# Patient Record
Sex: FEMALE | Race: WHITE
[De-identification: names, ages, dates, MRNs, and addresses within clinical notes are randomized per-mention and may not be internally consistent; named-entity substitution may affect disease eponyms.]

---

## 2018-01-08 ENCOUNTER — HOSPITAL ENCOUNTER (EMERGENCY)
Dept: HOSPITAL 95 - ER | Age: 33
Discharge: HOME | End: 2018-01-08
Payer: COMMERCIAL

## 2018-01-08 VITALS — BODY MASS INDEX: 28.25 KG/M2 | WEIGHT: 180.01 LBS | HEIGHT: 67 IN

## 2018-01-08 DIAGNOSIS — Z91.018: ICD-10-CM

## 2018-01-08 DIAGNOSIS — R06.2: ICD-10-CM

## 2018-01-08 DIAGNOSIS — R06.02: Primary | ICD-10-CM

## 2018-01-08 DIAGNOSIS — Z88.0: ICD-10-CM

## 2018-01-08 DIAGNOSIS — R05: ICD-10-CM

## 2018-01-08 DIAGNOSIS — Z90.49: ICD-10-CM

## 2018-01-08 LAB
ALBUMIN SERPL BCP-MCNC: 3.8 G/DL (ref 3.4–5)
ALBUMIN/GLOB SERPL: 0.8 {RATIO} (ref 0.8–1.8)
ALT SERPL W P-5'-P-CCNC: 14 U/L (ref 12–78)
ANION GAP SERPL CALCULATED.4IONS-SCNC: 10 MMOL/L (ref 6–16)
AST SERPL W P-5'-P-CCNC: 13 U/L (ref 12–37)
BASOPHILS # BLD AUTO: 0.11 K/MM3 (ref 0–0.23)
BASOPHILS NFR BLD AUTO: 1 % (ref 0–2)
BILIRUB SERPL-MCNC: 0.1 MG/DL (ref 0.1–1)
BUN SERPL-MCNC: 14 MG/DL (ref 8–24)
CALCIUM SERPL-MCNC: 8.6 MG/DL (ref 8.5–10.1)
CHLORIDE SERPL-SCNC: 109 MMOL/L (ref 98–108)
CO2 SERPL-SCNC: 24 MMOL/L (ref 21–32)
CREAT SERPL-MCNC: 0.88 MG/DL (ref 0.4–1)
DEPRECATED RDW RBC AUTO: 40 FL (ref 35.1–46.3)
EOSINOPHIL # BLD AUTO: 1.8 K/MM3 (ref 0–0.68)
EOSINOPHIL NFR BLD AUTO: 15 % (ref 0–6)
ERYTHROCYTE [DISTWIDTH] IN BLOOD BY AUTOMATED COUNT: 14.1 % (ref 11.7–14.2)
GLOBULIN SER CALC-MCNC: 4.5 G/DL (ref 2.2–4)
GLUCOSE SERPL-MCNC: 97 MG/DL (ref 70–99)
HCT VFR BLD AUTO: 36.7 % (ref 33–51)
HGB BLD-MCNC: 12.3 G/DL (ref 11.5–16)
IMM GRANULOCYTES # BLD AUTO: 0.02 K/MM3 (ref 0–0.1)
IMM GRANULOCYTES NFR BLD AUTO: 0 % (ref 0–1)
LYMPHOCYTES # BLD AUTO: 3.77 K/MM3 (ref 0.84–5.2)
LYMPHOCYTES NFR BLD AUTO: 32 % (ref 21–46)
MCHC RBC AUTO-ENTMCNC: 33.5 G/DL (ref 31.5–36.5)
MCV RBC AUTO: 79 FL (ref 80–100)
MONOCYTES # BLD AUTO: 0.65 K/MM3 (ref 0.16–1.47)
MONOCYTES NFR BLD AUTO: 6 % (ref 4–13)
NEUTROPHILS # BLD AUTO: 5.37 K/MM3 (ref 1.96–9.15)
NEUTROPHILS NFR BLD AUTO: 46 % (ref 41–73)
NRBC # BLD AUTO: 0 K/MM3 (ref 0–0.02)
NRBC BLD AUTO-RTO: 0 /100 WBC (ref 0–0.2)
PLATELET # BLD AUTO: 311 K/MM3 (ref 150–400)
POTASSIUM SERPL-SCNC: 3.3 MMOL/L (ref 3.5–5.5)
PROT SERPL-MCNC: 8.3 G/DL (ref 6.4–8.2)
SODIUM SERPL-SCNC: 143 MMOL/L (ref 136–145)

## 2019-04-15 ENCOUNTER — HOSPITAL ENCOUNTER (EMERGENCY)
Dept: HOSPITAL 95 - ER | Age: 34
Discharge: HOME | End: 2019-04-15
Payer: COMMERCIAL

## 2019-04-15 VITALS — BODY MASS INDEX: 25.77 KG/M2 | HEIGHT: 70 IN | WEIGHT: 180.01 LBS

## 2019-04-15 DIAGNOSIS — J45.909: ICD-10-CM

## 2019-04-15 DIAGNOSIS — Z91.018: ICD-10-CM

## 2019-04-15 DIAGNOSIS — N10: ICD-10-CM

## 2019-04-15 DIAGNOSIS — Z79.899: ICD-10-CM

## 2019-04-15 DIAGNOSIS — O03.9: Primary | ICD-10-CM

## 2019-04-15 DIAGNOSIS — G35: ICD-10-CM

## 2019-04-15 DIAGNOSIS — Z88.0: ICD-10-CM

## 2019-04-15 LAB
ALBUMIN SERPL BCP-MCNC: 3.2 G/DL
ALBUMIN/GLOB SERPL: 0.7 {RATIO}
ALT SERPL W P-5'-P-CCNC: 13 U/L
ANION GAP SERPL CALCULATED.4IONS-SCNC: 9 MMOL/L
AST SERPL W P-5'-P-CCNC: 12 U/L
B-HCG SERPL-ACNC: (no result) MIU/ML
BASOPHILS # BLD: 0 K/MM3
BASOPHILS NFR BLD: 0 %
BILIRUB SERPL-MCNC: 0.5 MG/DL
BILIRUB UR QL STRIP: (no result)
BUN SERPL-MCNC: 6 MG/DL
CALCIUM SERPL-MCNC: 8.8 MG/DL
CHLORIDE SERPL-SCNC: 104 MMOL/L
CO2 SERPL-SCNC: 22 MMOL/L
CREAT SERPL-MCNC: 0.64 MG/DL
DEPRECATED RDW RBC AUTO: 44.5 FL
EOSINOPHIL # BLD: 0.55 K/MM3
EOSINOPHIL NFR BLD: 2 %
ERYTHROCYTE [DISTWIDTH] IN BLOOD BY AUTOMATED COUNT: 16.4 %
GLOBULIN SER CALC-MCNC: 4.8 G/DL
GLUCOSE SERPL-MCNC: 115 MG/DL
HCT VFR BLD AUTO: 35 %
HGB BLD-MCNC: 11 G/DL
KETONES UR STRIP-MCNC: (no result) MG/DL
LEUKOCYTE ESTERASE UR QL STRIP: (no result)
LYMPHOCYTES # BLD: 1.94 K/MM3
LYMPHOCYTES NFR BLD: 7 %
MCHC RBC AUTO-ENTMCNC: 31.4 G/DL
MCV RBC AUTO: 76 FL
MONOCYTES # BLD: 1.11 K/MM3
MONOCYTES NFR BLD: 4 %
NEUTS BAND NFR BLD MANUAL: 2 %
NEUTS SEG # BLD MANUAL: 24.16 K/MM3
NEUTS SEG NFR BLD MANUAL: 85 %
NRBC # BLD AUTO: 0 K/MM3
NRBC BLD AUTO-RTO: 0 /100 WBC
PLATELET # BLD AUTO: 339 K/MM3
POTASSIUM SERPL-SCNC: 3.3 MMOL/L
PROT SERPL-MCNC: 8 G/DL
PROT UR STRIP-MCNC: (no result) MG/DL
RBC #/AREA URNS HPF: (no result) /HPF
SODIUM SERPL-SCNC: 135 MMOL/L
SP GR SPEC: 1.01
TOTAL CELLS COUNTED BLD: 100
UROBILINOGEN UR STRIP-MCNC: (no result) MG/DL
WBC #/AREA URNS HPF: (no result) /HPF

## 2019-06-24 ENCOUNTER — HOSPITAL ENCOUNTER (EMERGENCY)
Dept: HOSPITAL 95 - ER | Age: 34
Discharge: HOME | End: 2019-06-24
Payer: COMMERCIAL

## 2019-06-24 VITALS — BODY MASS INDEX: 12.17 KG/M2 | WEIGHT: 85.01 LBS | HEIGHT: 70 IN

## 2019-06-24 DIAGNOSIS — Z91.018: ICD-10-CM

## 2019-06-24 DIAGNOSIS — Z88.1: ICD-10-CM

## 2019-06-24 DIAGNOSIS — J45.909: ICD-10-CM

## 2019-06-24 DIAGNOSIS — N93.9: Primary | ICD-10-CM

## 2019-06-24 DIAGNOSIS — G35: ICD-10-CM

## 2019-06-24 LAB
ALBUMIN SERPL BCP-MCNC: 3.6 G/DL (ref 3.4–5)
ALBUMIN/GLOB SERPL: 0.9 {RATIO} (ref 0.8–1.8)
ALT SERPL W P-5'-P-CCNC: 19 U/L (ref 12–78)
ANION GAP SERPL CALCULATED.4IONS-SCNC: 8 MMOL/L (ref 6–16)
AST SERPL W P-5'-P-CCNC: 18 U/L (ref 12–37)
B-HCG SERPL-ACNC: 4 MIU/ML (ref 0–3)
BASOPHILS # BLD AUTO: 0.09 K/MM3 (ref 0–0.23)
BASOPHILS NFR BLD AUTO: 1 % (ref 0–2)
BILIRUB SERPL-MCNC: 0.2 MG/DL (ref 0.1–1)
BUN SERPL-MCNC: 9 MG/DL (ref 8–24)
CALCIUM SERPL-MCNC: 8.1 MG/DL (ref 8.5–10.1)
CHLORIDE SERPL-SCNC: 108 MMOL/L (ref 98–108)
CO2 SERPL-SCNC: 24 MMOL/L (ref 21–32)
CREAT SERPL-MCNC: 0.87 MG/DL (ref 0.4–1)
DEPRECATED RDW RBC AUTO: 42.7 FL (ref 35.1–46.3)
EOSINOPHIL # BLD AUTO: 0.57 K/MM3 (ref 0–0.68)
EOSINOPHIL NFR BLD AUTO: 6 % (ref 0–6)
ERYTHROCYTE [DISTWIDTH] IN BLOOD BY AUTOMATED COUNT: 16 % (ref 11.7–14.2)
GLOBULIN SER CALC-MCNC: 4 G/DL (ref 2.2–4)
GLUCOSE SERPL-MCNC: 89 MG/DL (ref 70–99)
HCT VFR BLD AUTO: 31.5 % (ref 33–51)
HGB BLD-MCNC: 9.6 G/DL (ref 11.5–16)
IMM GRANULOCYTES # BLD AUTO: 0.02 K/MM3 (ref 0–0.1)
IMM GRANULOCYTES NFR BLD AUTO: 0 % (ref 0–1)
LYMPHOCYTES # BLD AUTO: 2.27 K/MM3 (ref 0.84–5.2)
LYMPHOCYTES NFR BLD AUTO: 25 % (ref 21–46)
MCHC RBC AUTO-ENTMCNC: 30.5 G/DL (ref 31.5–36.5)
MCV RBC AUTO: 74 FL (ref 80–100)
MONOCYTES # BLD AUTO: 0.78 K/MM3 (ref 0.16–1.47)
MONOCYTES NFR BLD AUTO: 9 % (ref 4–13)
NEUTROPHILS # BLD AUTO: 5.24 K/MM3 (ref 1.96–9.15)
NEUTROPHILS NFR BLD AUTO: 58 % (ref 41–73)
NRBC # BLD AUTO: 0 K/MM3 (ref 0–0.02)
NRBC BLD AUTO-RTO: 0 /100 WBC (ref 0–0.2)
PLATELET # BLD AUTO: 292 K/MM3 (ref 150–400)
POTASSIUM SERPL-SCNC: 3.3 MMOL/L (ref 3.5–5.5)
PROT SERPL-MCNC: 7.6 G/DL (ref 6.4–8.2)
SODIUM SERPL-SCNC: 140 MMOL/L (ref 136–145)

## 2019-06-28 ENCOUNTER — HOSPITAL ENCOUNTER (OUTPATIENT)
Dept: HOSPITAL 95 - ORSCSDS | Age: 34
Discharge: HOME | End: 2019-06-28
Attending: OBSTETRICS & GYNECOLOGY
Payer: COMMERCIAL

## 2019-06-28 VITALS — HEIGHT: 70 IN | WEIGHT: 179.24 LBS | BODY MASS INDEX: 25.66 KG/M2

## 2019-06-28 DIAGNOSIS — J45.909: ICD-10-CM

## 2019-06-28 DIAGNOSIS — F17.210: ICD-10-CM

## 2019-06-28 DIAGNOSIS — Z79.899: ICD-10-CM

## 2019-06-28 DIAGNOSIS — O03.39: Primary | ICD-10-CM

## 2019-06-28 DIAGNOSIS — D64.9: ICD-10-CM

## 2019-06-28 PROCEDURE — 0UDB7ZX EXTRACTION OF ENDOMETRIUM, VIA NATURAL OR ARTIFICIAL OPENING, DIAGNOSTIC: ICD-10-PCS | Performed by: OBSTETRICS & GYNECOLOGY

## 2019-06-28 PROCEDURE — 0UJD8ZZ INSPECTION OF UTERUS AND CERVIX, VIA NATURAL OR ARTIFICIAL OPENING ENDOSCOPIC: ICD-10-PCS | Performed by: OBSTETRICS & GYNECOLOGY

## 2019-06-28 PROCEDURE — 0UDB8ZX EXTRACTION OF ENDOMETRIUM, VIA NATURAL OR ARTIFICIAL OPENING ENDOSCOPIC, DIAGNOSTIC: ICD-10-PCS | Performed by: OBSTETRICS & GYNECOLOGY

## 2019-06-28 PROCEDURE — 0UDB8ZZ EXTRACTION OF ENDOMETRIUM, VIA NATURAL OR ARTIFICIAL OPENING ENDOSCOPIC: ICD-10-PCS | Performed by: OBSTETRICS & GYNECOLOGY

## 2019-06-28 NOTE — NUR
06/28/19 1136 Keyla Ledesma
SHELBIE PAD CHANGED, WITH DISP UNDERWEAR, MODERATE AMT BLOOD STAIN ON
CHUCKS, NO CLOTS. PATIENT C/O CRAMPING LIKE DISCOMFORT IN BACK, 4/10.
DECLINES PAIN MEDS AT THIS TIME, METHERGIN IM PER ORDER. DINORA WELL

## 2019-06-28 NOTE — NUR
06/28/19 0943 Kostas Melendez A
PATIENT UNABLE TO VOID UPON ENTERING PRE OP AREA FOR URINE SAMPLE AND
PREGNACY TEST. PATIENT UP FROM BED AND AMBULATING TO RESTROOM TO
PROVIDE SAMPLE AT THIS TIME. SAMPLE COLLECTED AND BEING RAN BY
CARMENCITA OROURKE AT THIS TIME

## 2019-06-28 NOTE — NUR
06/28/19 1103 Delroy Sr
PATIENT SHELBIE PAD DRESSING SATURATED W/ BLOOD AND LARGE CLOT. DR MALONEYLY NOTIFIED AND METHERGINE ORDERED.